# Patient Record
Sex: FEMALE | Race: WHITE | NOT HISPANIC OR LATINO | Employment: OTHER | URBAN - NONMETROPOLITAN AREA
[De-identification: names, ages, dates, MRNs, and addresses within clinical notes are randomized per-mention and may not be internally consistent; named-entity substitution may affect disease eponyms.]

---

## 2023-06-07 ENCOUNTER — OFFICE VISIT (OUTPATIENT)
Dept: PRIMARY CARE | Facility: CLINIC | Age: 63
End: 2023-06-07
Payer: COMMERCIAL

## 2023-06-07 VITALS
HEART RATE: 75 BPM | WEIGHT: 163.1 LBS | BODY MASS INDEX: 24.16 KG/M2 | HEIGHT: 69 IN | OXYGEN SATURATION: 99 % | SYSTOLIC BLOOD PRESSURE: 122 MMHG | DIASTOLIC BLOOD PRESSURE: 78 MMHG

## 2023-06-07 DIAGNOSIS — H10.13 ALLERGIC CONJUNCTIVITIS OF BOTH EYES: ICD-10-CM

## 2023-06-07 DIAGNOSIS — E78.00 HIGH CHOLESTEROL: Primary | ICD-10-CM

## 2023-06-07 DIAGNOSIS — G43.009 MIGRAINE WITHOUT AURA AND WITHOUT STATUS MIGRAINOSUS, NOT INTRACTABLE: ICD-10-CM

## 2023-06-07 PROCEDURE — 99214 OFFICE O/P EST MOD 30 MIN: CPT | Performed by: FAMILY MEDICINE

## 2023-06-07 PROCEDURE — 1036F TOBACCO NON-USER: CPT | Performed by: FAMILY MEDICINE

## 2023-06-07 RX ORDER — SUMATRIPTAN 50 MG/1
50 TABLET, FILM COATED ORAL EVERY 2 HOUR PRN
Qty: 9 TABLET | Refills: 11 | Status: SHIPPED | OUTPATIENT
Start: 2023-06-07

## 2023-06-07 RX ORDER — AZELASTINE HYDROCHLORIDE 0.5 MG/ML
1 SOLUTION/ DROPS OPHTHALMIC 2 TIMES DAILY
Qty: 6 ML | Refills: 11 | Status: SHIPPED | OUTPATIENT
Start: 2023-06-07 | End: 2024-06-07 | Stop reason: SDUPTHER

## 2023-06-07 RX ORDER — ROSUVASTATIN CALCIUM 20 MG/1
20 TABLET, COATED ORAL DAILY
Qty: 90 TABLET | Refills: 3 | Status: SHIPPED | OUTPATIENT
Start: 2023-06-07 | End: 2024-06-07 | Stop reason: SDUPTHER

## 2023-06-07 RX ORDER — ASPIRIN 81 MG/1
81 TABLET ORAL
COMMUNITY

## 2023-06-07 RX ORDER — SUMATRIPTAN 50 MG/1
TABLET, FILM COATED ORAL
COMMUNITY
End: 2023-06-07 | Stop reason: SDUPTHER

## 2023-06-07 RX ORDER — ROSUVASTATIN CALCIUM 20 MG/1
20 TABLET, COATED ORAL DAILY
COMMUNITY
End: 2023-06-07 | Stop reason: SDUPTHER

## 2023-06-07 RX ORDER — FLUTICASONE PROPIONATE 50 MCG
SPRAY, SUSPENSION (ML) NASAL
COMMUNITY

## 2023-06-07 ASSESSMENT — PATIENT HEALTH QUESTIONNAIRE - PHQ9
2. FEELING DOWN, DEPRESSED OR HOPELESS: NOT AT ALL
SUM OF ALL RESPONSES TO PHQ9 QUESTIONS 1 AND 2: 0
1. LITTLE INTEREST OR PLEASURE IN DOING THINGS: NOT AT ALL

## 2023-06-07 NOTE — PROGRESS NOTES
"Subjective   Patient ID: Latanya Rushing is a 62 y.o. female who presents for 1 year mdck.    HPI   High cholesterol has been on crestor for 4 years with no SE     All Rhin otc meds    On eye drops rx  spring is worse     Migraine HA with no aura some photophobia     On sumatriptan uses less than 18 per year    Usually takes 2 sumatriptan  most of time         Mammogram with [facility]  Bone density osteopenia and on calcium vitamin d  Aerobic exercise plays pickleball 3 to 4 times  per week   Colonoscopy July 22, 2019 needed every 5 years for family history      Review of Systems    Objective   /78   Pulse 75   Ht 1.753 m (5' 9\")   Wt 74 kg (163 lb 1.6 oz)   SpO2 99%   BMI 24.09 kg/m²     Physical Exam  Vitals reviewed.   Constitutional:       Appearance: Normal appearance.   HENT:      Head: Normocephalic and atraumatic.   Eyes:      Conjunctiva/sclera: Conjunctivae normal.   Cardiovascular:      Rate and Rhythm: Normal rate and regular rhythm.   Pulmonary:      Effort: Pulmonary effort is normal.      Breath sounds: Normal breath sounds.   Musculoskeletal:      Cervical back: Neck supple.   Skin:     General: Skin is warm and dry.   Neurological:      General: No focal deficit present.      Mental Status: She is alert and oriented to person, place, and time.   Psychiatric:         Mood and Affect: Mood normal.         Behavior: Behavior normal.         Thought Content: Thought content normal.         Judgment: Judgment normal.         Assessment/Plan   Diagnoses and all orders for this visit:  High cholesterol  -     rosuvastatin (Crestor) 20 mg tablet; Take 1 tablet (20 mg) by mouth once daily.  Allergic conjunctivitis of both eyes  -     azelastine (Optivar) 0.05 % ophthalmic solution; Administer 1 drop into both eyes 2 times a day.  Migraine without aura and without status migrainosus, not intractable  -     SUMAtriptan (Imitrex) 50 mg tablet; Take 1 tablet (50 mg) by mouth every 2 hours if needed " for migraine (with max of 200 mg per day).

## 2023-07-31 ENCOUNTER — TELEMEDICINE (OUTPATIENT)
Dept: PRIMARY CARE | Facility: CLINIC | Age: 63
End: 2023-07-31
Payer: COMMERCIAL

## 2023-07-31 DIAGNOSIS — U07.1 COVID-19 VIRUS INFECTION: Primary | ICD-10-CM

## 2023-07-31 PROCEDURE — 99441 PR PHYS/QHP TELEPHONE EVALUATION 5-10 MIN: CPT | Performed by: FAMILY MEDICINE

## 2023-07-31 NOTE — PROGRESS NOTES
Subjective   Patient ID: Latanya Rushing is a 63 y.o. female who presents for pt. covid+ (Pt. C/o headache, cough, ST, chills, body aches, and congestions x 3 days.).    HPI     VV telephone       + covid  yesterday   Mother had covid   Cough Friday July 28   Had covid 2 years ago   3 vaccines         Review of Systems    Objective   There were no vitals taken for this visit.    Physical Exam  Vitals reviewed.   Constitutional:       Appearance: Normal appearance.   HENT:      Nose: Congestion (sounds congested on phone) present.   Skin:     General: Skin is warm.   Neurological:      General: No focal deficit present.      Mental Status: She is alert and oriented to person, place, and time.   Psychiatric:         Mood and Affect: Mood normal.         Behavior: Behavior normal.         Thought Content: Thought content normal.         Judgment: Judgment normal.         Assessment/Plan   Diagnoses and all orders for this visit:  COVID-19 virus infection  -     nirmatrelvir-ritonavir (PAXLOVID) 300 mg (150 mg x 2)-100 mg tablet therapy pack; Take 3 tablets by mouth 2 times a day for 5 days. Follow the instructions on the package       Will have pt hold crestor

## 2023-09-01 ENCOUNTER — OFFICE VISIT (OUTPATIENT)
Dept: PRIMARY CARE | Facility: CLINIC | Age: 63
End: 2023-09-01
Payer: COMMERCIAL

## 2023-09-01 VITALS
BODY MASS INDEX: 24.59 KG/M2 | SYSTOLIC BLOOD PRESSURE: 112 MMHG | DIASTOLIC BLOOD PRESSURE: 80 MMHG | HEART RATE: 96 BPM | WEIGHT: 166 LBS | OXYGEN SATURATION: 97 % | HEIGHT: 69 IN

## 2023-09-01 DIAGNOSIS — M25.531 RIGHT WRIST PAIN: Primary | ICD-10-CM

## 2023-09-01 PROCEDURE — 99213 OFFICE O/P EST LOW 20 MIN: CPT | Performed by: FAMILY MEDICINE

## 2023-09-01 PROCEDURE — 1036F TOBACCO NON-USER: CPT | Performed by: FAMILY MEDICINE

## 2023-09-01 NOTE — PROGRESS NOTES
"Subjective   Patient ID: Latanya Rushing is a 63 y.o. female who presents for fall (Pt c/o fall and hurt right wrist.).    HPI   Playing pickleball this afternoon and fell to the right with paddle and jammend wrist and now painful and swollen   Iceing       Review of Systems    Objective   /80   Pulse 96   Ht 1.753 m (5' 9\")   Wt 75.3 kg (166 lb)   SpO2 97%   BMI 24.51 kg/m²     Physical Exam  Constitutional:       Appearance: Normal appearance.   Musculoskeletal:      Comments: FROMof shoulder and elbow,  seems most tender radial side of wrist base of right thumb   Normal ms strength of right thumb    Skin:     General: Skin is warm and dry.   Neurological:      Mental Status: She is alert.   Psychiatric:         Mood and Affect: Mood normal.         Behavior: Behavior normal.         Thought Content: Thought content normal.         Judgment: Judgment normal.         Assessment/Plan   Diagnoses and all orders for this visit:  Right wrist pain  -     XR wrist right 3+ views; Future  -     XR hand right 1-2 views; Future    If fx will seen Dr HUITRON   "

## 2024-06-07 ENCOUNTER — OFFICE VISIT (OUTPATIENT)
Dept: PRIMARY CARE | Facility: CLINIC | Age: 64
End: 2024-06-07
Payer: COMMERCIAL

## 2024-06-07 VITALS
DIASTOLIC BLOOD PRESSURE: 76 MMHG | OXYGEN SATURATION: 97 % | HEART RATE: 81 BPM | BODY MASS INDEX: 24.55 KG/M2 | HEIGHT: 68 IN | WEIGHT: 162 LBS | SYSTOLIC BLOOD PRESSURE: 116 MMHG

## 2024-06-07 DIAGNOSIS — E78.1 HYPERTRIGLYCERIDEMIA: ICD-10-CM

## 2024-06-07 DIAGNOSIS — Z12.31 ENCOUNTER FOR SCREENING MAMMOGRAM FOR BREAST CANCER: ICD-10-CM

## 2024-06-07 DIAGNOSIS — H10.13 ALLERGIC CONJUNCTIVITIS OF BOTH EYES: ICD-10-CM

## 2024-06-07 DIAGNOSIS — G47.10 HYPERSOMNOLENCE: ICD-10-CM

## 2024-06-07 DIAGNOSIS — E78.00 HIGH CHOLESTEROL: ICD-10-CM

## 2024-06-07 DIAGNOSIS — Z12.11 COLON CANCER SCREENING: Primary | ICD-10-CM

## 2024-06-07 PROCEDURE — 99214 OFFICE O/P EST MOD 30 MIN: CPT | Performed by: FAMILY MEDICINE

## 2024-06-07 PROCEDURE — 1036F TOBACCO NON-USER: CPT | Performed by: FAMILY MEDICINE

## 2024-06-07 RX ORDER — AZELASTINE HYDROCHLORIDE 0.5 MG/ML
1 SOLUTION/ DROPS OPHTHALMIC 2 TIMES DAILY
Qty: 6 ML | Refills: 11 | Status: SHIPPED | OUTPATIENT
Start: 2024-06-07 | End: 2025-06-07

## 2024-06-07 RX ORDER — ROSUVASTATIN CALCIUM 20 MG/1
20 TABLET, COATED ORAL DAILY
Qty: 90 TABLET | Refills: 3 | Status: SHIPPED | OUTPATIENT
Start: 2024-06-07 | End: 2025-06-07

## 2024-06-07 NOTE — PROGRESS NOTES
"Subjective   Patient ID: Latanya Rushing is a 63 y.o. female who presents for 1 year follow up .    HPI   High cholesterol has been on crestor for 5 years with no SE     Was started due to high CT calcium score      All Rhin otc meds    On eye drops rx  spring is worse      Migraine HA with no aura some photophobia     Usually takes 2 sumatriptan  most of time     Will call when needing refill     Last sumatriptan 7 months        Ankle swelling   By night is worse   No pnd orthopnea   Treadmill 3 to 4 days per week   Pickleball 3 to 4 days per week      Mammogram  Bone density osteopenia and on calcium vitamin d  Aerobic exercise plays pickleball 3 to 4 times  per week   Colonoscopy July 22, 2019 needed every 5 years for family history    Father had Alzheimers    States some memory issues    Discussed ways to maintain mental activity         Hypersomnolence   Snoring   Apnea per    Gets normal amount of sleep   Occ am HA and has nigjht time congestion     Review of Systems    Objective   /76   Pulse 81   Ht 1.727 m (5' 8\")   Wt 73.5 kg (162 lb)   SpO2 97%   BMI 24.63 kg/m²     Physical Exam  Vitals reviewed.   Constitutional:       Appearance: Normal appearance.   HENT:      Head: Normocephalic and atraumatic.   Eyes:      Conjunctiva/sclera: Conjunctivae normal.   Cardiovascular:      Rate and Rhythm: Normal rate and regular rhythm.   Pulmonary:      Effort: Pulmonary effort is normal.      Breath sounds: Normal breath sounds.   Musculoskeletal:      Cervical back: Neck supple.   Skin:     General: Skin is warm and dry.   Neurological:      General: No focal deficit present.      Mental Status: She is alert and oriented to person, place, and time.   Psychiatric:         Mood and Affect: Mood normal.         Behavior: Behavior normal.         Thought Content: Thought content normal.         Judgment: Judgment normal.         Assessment/Plan   Diagnoses and all orders for this visit:  Colon cancer " screening  -     Colonoscopy Screening; Average Risk Patient; Future  Allergic conjunctivitis of both eyes  -     azelastine (Optivar) 0.05 % ophthalmic solution; Administer 1 drop into both eyes 2 times a day.  High cholesterol  -     rosuvastatin (Crestor) 20 mg tablet; Take 1 tablet (20 mg) by mouth once daily.  -     Lipid Panel; Future  -     Basic Metabolic Panel; Future  -     TSH with reflex to Free T4 if abnormal; Future  Encounter for screening mammogram for breast cancer  -     BI mammo bilateral screening tomosynthesis; Future  Hypersomnolence  -     Home sleep apnea test (HSAT); Future  Hypertriglyceridemia  -     CT cardiac scoring wo IV contrast; Future

## 2024-06-08 ENCOUNTER — LAB (OUTPATIENT)
Dept: LAB | Facility: LAB | Age: 64
End: 2024-06-08
Payer: COMMERCIAL

## 2024-06-08 DIAGNOSIS — E78.00 HIGH CHOLESTEROL: ICD-10-CM

## 2024-06-08 LAB
ANION GAP SERPL CALC-SCNC: 11 MMOL/L (ref 10–20)
BUN SERPL-MCNC: 22 MG/DL (ref 6–23)
CALCIUM SERPL-MCNC: 9.5 MG/DL (ref 8.6–10.3)
CHLORIDE SERPL-SCNC: 104 MMOL/L (ref 98–107)
CHOLEST SERPL-MCNC: 158 MG/DL (ref 0–199)
CHOLESTEROL/HDL RATIO: 2.5
CO2 SERPL-SCNC: 29 MMOL/L (ref 21–32)
CREAT SERPL-MCNC: 0.74 MG/DL (ref 0.5–1.05)
EGFRCR SERPLBLD CKD-EPI 2021: >90 ML/MIN/1.73M*2
GLUCOSE SERPL-MCNC: 101 MG/DL (ref 74–99)
HDLC SERPL-MCNC: 62 MG/DL
LDLC SERPL CALC-MCNC: 64 MG/DL
NON HDL CHOLESTEROL: 96 MG/DL (ref 0–149)
POTASSIUM SERPL-SCNC: 4.5 MMOL/L (ref 3.5–5.3)
SODIUM SERPL-SCNC: 139 MMOL/L (ref 136–145)
TRIGL SERPL-MCNC: 159 MG/DL (ref 0–149)
TSH SERPL-ACNC: 1.91 MIU/L (ref 0.44–3.98)
VLDL: 32 MG/DL (ref 0–40)

## 2024-06-08 PROCEDURE — 80061 LIPID PANEL: CPT

## 2024-06-08 PROCEDURE — 36415 COLL VENOUS BLD VENIPUNCTURE: CPT

## 2024-06-08 PROCEDURE — 84443 ASSAY THYROID STIM HORMONE: CPT

## 2024-06-08 PROCEDURE — 80048 BASIC METABOLIC PNL TOTAL CA: CPT

## 2024-06-18 ENCOUNTER — HOSPITAL ENCOUNTER (OUTPATIENT)
Dept: RADIOLOGY | Facility: HOSPITAL | Age: 64
Discharge: HOME | End: 2024-06-18
Payer: COMMERCIAL

## 2024-06-18 ENCOUNTER — APPOINTMENT (OUTPATIENT)
Dept: RADIOLOGY | Facility: HOSPITAL | Age: 64
End: 2024-06-18
Payer: COMMERCIAL

## 2024-06-18 DIAGNOSIS — E78.1 HYPERTRIGLYCERIDEMIA: ICD-10-CM

## 2024-06-18 PROCEDURE — 75571 CT HRT W/O DYE W/CA TEST: CPT

## 2024-06-21 ENCOUNTER — APPOINTMENT (OUTPATIENT)
Dept: PRIMARY CARE | Facility: CLINIC | Age: 64
End: 2024-06-21
Payer: COMMERCIAL

## 2024-06-21 DIAGNOSIS — Z12.11 COLON CANCER SCREENING: ICD-10-CM

## 2024-07-11 DIAGNOSIS — G47.10 HYPERSOMNOLENCE: ICD-10-CM

## 2024-07-11 DIAGNOSIS — R93.1 AGATSTON CORONARY ARTERY CALCIUM SCORE BETWEEN 100 AND 400: Primary | ICD-10-CM

## 2024-07-15 ENCOUNTER — CLINICAL SUPPORT (OUTPATIENT)
Dept: SLEEP MEDICINE | Facility: HOSPITAL | Age: 64
End: 2024-07-15
Payer: COMMERCIAL

## 2024-07-15 VITALS — BODY MASS INDEX: 24.56 KG/M2 | HEIGHT: 68 IN | WEIGHT: 162.04 LBS

## 2024-07-15 DIAGNOSIS — G47.9 SLEEP DISORDER, UNSPECIFIED: ICD-10-CM

## 2024-07-15 DIAGNOSIS — G47.10 HYPERSOMNOLENCE: ICD-10-CM

## 2024-07-15 PROCEDURE — 95806 SLEEP STUDY UNATT&RESP EFFT: CPT | Performed by: PSYCHIATRY & NEUROLOGY

## 2024-07-15 NOTE — PROGRESS NOTES
[facility] TECH NOTE:     Patient: Latanya Rushing   MRN//AGE: [id number]  1960  64 y.o.   Technologist: Susy Woodson RRT   Room: Home Sleep Study   Service Date: 7/15/2024        Sleep Testing Location: [place] [address]      [place]: 10    TECHNOLOGIST SLEEP STUDY PROCEDURE NOTE:   This sleep study is being conducted according to the policies and procedures outlined by the AAS accreditation standards.  The sleep study procedure and processes involved during this appointment was explained to the patient/patient’s family, questions were answered. The patient/family verbalized understanding.      The patient is a 64 y.o. year old female scheduled for a Home Sleep Apnea test.      The full study Was completed.  Patient questionnaires completed?: yes     Consents signed? yes    Initial Fall Risk Screening:     Latanya has not fallen in the last 6 months. her did not result in injury. Latanya does not have a fear of falling. He does not need assistance with sitting, standing, or walking. she does not need assistance walking in her home. she does not need assistance in an unfamiliar setting. The patient is notusing an assistive device.     The Patient received equipment and instructions for use of the [employer] HSAT device. The patient was instructed how to apply the effort belt, cannula, thermistor. It was explained how the Nomad and oximeter components work. Patient was informed of their responsibility for the device and acknowledged this by signing the HSAT device contract. The patient was asked to return the device on the next day and shown where to return.     Brief Study observations:      Deviation to order/protocol and reason:       If PAP, which was preferred mask/pressure/mode:       Other:    After the procedure, the patient/family was informed to ensure followup with ordering clinician for testing results.      Technologist: Susy Woodson RRT

## 2024-07-18 ENCOUNTER — TELEPHONE (OUTPATIENT)
Dept: PRIMARY CARE | Facility: CLINIC | Age: 64
End: 2024-07-18
Payer: COMMERCIAL

## 2024-07-18 DIAGNOSIS — H10.13 ALLERGIC CONJUNCTIVITIS OF BOTH EYES: Primary | ICD-10-CM

## 2024-07-18 RX ORDER — MONTELUKAST SODIUM 10 MG/1
10 TABLET ORAL NIGHTLY
Qty: 30 TABLET | Refills: 3 | Status: SHIPPED | OUTPATIENT
Start: 2024-07-18

## 2024-07-18 RX ORDER — MONTELUKAST SODIUM 10 MG/1
10 TABLET ORAL NIGHTLY
COMMUNITY
End: 2024-07-18 | Stop reason: SDUPTHER

## 2024-07-18 NOTE — TELEPHONE ENCOUNTER
Patient called in and would like a refill of the following medication.... singular. She takes one at night as needed. She would like this called into [facility] pharmacy in [place].    Thank you

## 2024-07-30 ENCOUNTER — APPOINTMENT (OUTPATIENT)
Dept: CARDIOLOGY | Facility: CLINIC | Age: 64
End: 2024-07-30
Payer: COMMERCIAL

## 2024-08-06 ENCOUNTER — APPOINTMENT (OUTPATIENT)
Dept: CARDIOLOGY | Facility: CLINIC | Age: 64
End: 2024-08-06
Payer: COMMERCIAL

## 2024-08-06 VITALS
BODY MASS INDEX: 26.02 KG/M2 | WEIGHT: 165.8 LBS | OXYGEN SATURATION: 98 % | HEART RATE: 94 BPM | DIASTOLIC BLOOD PRESSURE: 80 MMHG | HEIGHT: 67 IN | SYSTOLIC BLOOD PRESSURE: 124 MMHG

## 2024-08-06 DIAGNOSIS — R93.1 AGATSTON CORONARY ARTERY CALCIUM SCORE BETWEEN 100 AND 400: ICD-10-CM

## 2024-08-06 DIAGNOSIS — I70.90 ATHEROSCLEROSIS: Primary | ICD-10-CM

## 2024-08-06 PROCEDURE — 1036F TOBACCO NON-USER: CPT | Performed by: STUDENT IN AN ORGANIZED HEALTH CARE EDUCATION/TRAINING PROGRAM

## 2024-08-06 PROCEDURE — 99214 OFFICE O/P EST MOD 30 MIN: CPT | Performed by: STUDENT IN AN ORGANIZED HEALTH CARE EDUCATION/TRAINING PROGRAM

## 2024-08-06 PROCEDURE — 3008F BODY MASS INDEX DOCD: CPT | Performed by: STUDENT IN AN ORGANIZED HEALTH CARE EDUCATION/TRAINING PROGRAM

## 2024-08-06 NOTE — PROGRESS NOTES
Chief Complaint   Patient presents with    Results     CT cardiac score     HPI:  I was requested by Dr. Ku to evaluate this patient in consultation for cardiac assessment.    Mrs. Latanya Rushing is a 64 y.o. year old non-smoker female patient with past medical history significant for hypertension, hyperlipidemia, coming for cardiovascular assessment due to elevated calcium scoring of 328. Patient is very active, exercises every day, has a healthy diet, takes all her medications properly but she is frustrated that she has elevated calcium scoring. She is currently asymptomatic. She denies chest pain, shortness of breath, palpitations, leg edema, lightheadedness, headaches, fever, chills, orthopnea, paroxysmal nocturnal dyspnea or syncope.    EKG shows normal sinus rhythm with no signs of acute ischemic changes.     Past Medical History  Past Medical History:   Diagnosis Date    Personal history of other specified conditions 06/27/2022    History of persistent cough       Past Surgical History  Past Surgical History:   Procedure Laterality Date    OTHER SURGICAL HISTORY  11/18/2019    Colonoscopy    OTHER SURGICAL HISTORY  11/18/2019    Tonsillectomy with adenoidectomy    OTHER SURGICAL HISTORY  11/18/2019    Urethral dilation    OTHER SURGICAL HISTORY  11/18/2019    Endometrial ablation    OTHER SURGICAL HISTORY  03/01/2021    Dilation and curettage       Past Family History  No family history on file.    Allergy History  Allergies   Allergen Reactions    Amoxicillin Itching    Avocado Other    Oxytetracycline Other     Other reaction(s): Unknown    Phenobarbital Unknown     Other reaction(s): Hives    Shellfish Containing Products Other    Shellfish Derived Other     Other reaction(s): Gatrointestinal upset    Terramycin Other    Sulfamethoxazole-Trimethoprim Rash       Past Social History  Social History     Socioeconomic History    Marital status:    Tobacco Use    Smoking status: Never    Smokeless  "tobacco: Never   Substance and Sexual Activity    Alcohol use: Not Currently     Alcohol/week: 4.0 standard drinks of alcohol     Types: 4 Standard drinks or equivalent per week    Drug use: Never       Social History     Tobacco Use   Smoking Status Never   Smokeless Tobacco Never       Review of Systems:  Constitutional: Denies any fever or chills  Eyes: Denies any eye pain or blurry vision  ENT: Denies any ear pain or hearing loss  Cardiovascular: The heart rate is not slow, the heart rate is not fast  Respiratory: Denies any asthma/wheezing  Gastrointestinal: Denies any olman colored stools or fatty food intolerance  Genitourinary: Denies any blood in the urine or pelvic pain  Musculoskeletal: Denies any swelling in the joints or difficulty walking  Skin: Denies any skin lumps or skin lesions  Neurological: Denies any dizziness/tingling      Objective Data:  Last Recorded Vitals:  Vitals:    08/06/24 1506   BP: 124/80   Pulse: 94   SpO2: 98%   Weight: 75.2 kg (165 lb 12.8 oz)   Height: 1.702 m (5' 7\")       Last Labs:  CBC - No results in last year.  _ _ _    _      CMP - 6/8/2024:  8:09 AM  9.5 _ _ --- _   _ _ _ _      PTT - No results in last year.  _   _ _     LDLCALC   Date/Time Value Ref Range Status   06/08/2024 08:09 AM 64 <=99 mg/dL Final     Comment:                                 Near   Borderline      AGE      Desirable  Optimal    High     High     Very High     0-19 Y     0 - 109     ---    110-129   >/= 130     ----    20-24 Y     0 - 119     ---    120-159   >/= 160     ----      >24 Y     0 -  99   100-129  130-159   160-189     >/=190       VLDL   Date/Time Value Ref Range Status   06/08/2024 08:09 AM 32 0 - 40 mg/dL Final   06/23/2022 08:52 AM 32 0 - 40 mg/dL Final   06/04/2021 07:56 AM 16 0 - 40 mg/dL Final   06/09/2020 10:56 AM 42 0 - 40 mg/dL Final        Patient Medications:  Outpatient Encounter Medications as of 8/6/2024   Medication Sig Dispense Refill    aspirin 81 mg EC tablet Take 1 " tablet (81 mg) by mouth once daily.      azelastine (Optivar) 0.05 % ophthalmic solution Administer 1 drop into both eyes 2 times a day. 6 mL 11    fluticasone (Flonase Allergy Relief) 50 mcg/actuation nasal spray Administer into affected nostril(s).      loratadine (Claritin) 5 mg split tablet Take by mouth.      montelukast (Singulair) 10 mg tablet Take 1 tablet (10 mg) by mouth once daily at bedtime. 30 tablet 3    multivitamin-Ca-iron-minerals tablet Take by mouth once daily.      rosuvastatin (Crestor) 20 mg tablet Take 1 tablet (20 mg) by mouth once daily. 90 tablet 3    SUMAtriptan (Imitrex) 50 mg tablet Take 1 tablet (50 mg) by mouth every 2 hours if needed for migraine (with max of 200 mg per day). 9 tablet 11     No facility-administered encounter medications on file as of 8/6/2024.       Physical Exam:  General: alert, oriented and in no acute distress  HEENT: NC/AT; EOMI; PERRLA, external ear is normal  Neck: supple; trachea midline; no masses; no JVD  Chest: clear breath sounds bilaterally; no wheezing  Cardio: regular rhythm, S1S2 normal, no murmurs  Abdomen: Soft, non-tender, non-distension, no organomegaly  Extremities: no clubbing/cyanosis/edema  Neuro: Grossly intact     Psychiatric: Normal mood and affect     Past Cardiology Results (Last 3 Years):  EKG:  No results found for this or any previous visit from the past 1095 days.    Echo:  Echo Results:  No results found for this or any previous visit from the past 365 days.     Cath:  No results found for this or any previous visit from the past 1095 days.    CV NCDR CATHPCI V5 COLLECTION FORM   Stress Test:  No results found for this or any previous visit from the past 1095 days.    Cardiac Imaging:  No results found for this or any previous visit from the past 1095 days.       Assessment/Plan     Mrs. Latanya Rushing is a 64 y.o. year old non-smoker female patient with past medical history significant for hypertension, hyperlipidemia, coming for  cardiovascular assessment due to elevated calcium scoring of 328. Patient is very active, exercises every day, has a healthy diet, takes all her medications properly but she is frustrated that she has elevated calcium scoring. She is currently asymptomatic.     # Atherosclerosis  - Ct calcium scoring 328.  - Patient with known atherosclerotic disease, implying significant increased risk for major adverse cardiac events.  - Patient is currently asymptomatic.  - EKG shows normal sinus rhythm with no signs of acute ischemic changes.   - Keep ASA, high intensity statin.  - We had a thorough conversation with the patient about the natural history of atherosclerosis and the importance for commitment with healthy lifestyle, including but not limited to healthy diet, regular exercises, avoid sedentary and compliance to medication.   - Follow up with echo, stress test, 48h holter monitor.    # Hyperlipidemia  - Controlled by PCP.  - Keep home medication with Rosuvastatin 20mg daily.  - Counseled on healthy diet and regular exercise.      We have discussed the most common side effects of the prescribed medications, indications, drug interactions, risks, complications, and alternatives of medications/therapeutics were explained and discussed. The patient has been requested to monitor closely for any untoward side effects or complications of medications. The patient has been strongly advised to be compliant with the recommendations, all the questions and concerns have been addressed. The patient has been also instructed to call, to return sooner or to go to the emergency department if symptoms persist or get worsen. The patient voiced understanding and denies any further questions at this time.     This note was transcribed using the Dragon Dictation system. There may be grammatical, punctuation, or verbiage errors that occur with voice recognition programs.    Counseling greater than 50% of visit regarding all cardiac  issues.    Thank you, Dr. Ku, for allowing me to participate in the care of this patient. Please do not hesitate to contact me with any further questions or concerns.    Miki Matt MD  Cardiology

## 2024-09-16 ENCOUNTER — APPOINTMENT (OUTPATIENT)
Dept: CARDIOLOGY | Facility: HOSPITAL | Age: 64
End: 2024-09-16
Payer: COMMERCIAL

## 2024-10-07 ENCOUNTER — APPOINTMENT (OUTPATIENT)
Dept: CARDIOLOGY | Facility: CLINIC | Age: 64
End: 2024-10-07
Payer: COMMERCIAL

## 2024-10-14 ENCOUNTER — APPOINTMENT (OUTPATIENT)
Dept: CARDIOLOGY | Facility: CLINIC | Age: 64
End: 2024-10-14
Payer: COMMERCIAL

## 2024-12-19 ENCOUNTER — TELEMEDICINE (OUTPATIENT)
Dept: PRIMARY CARE | Facility: CLINIC | Age: 64
End: 2024-12-19
Payer: COMMERCIAL

## 2024-12-19 DIAGNOSIS — U07.1 COVID-19: Primary | ICD-10-CM

## 2024-12-19 PROCEDURE — 1036F TOBACCO NON-USER: CPT

## 2024-12-19 PROCEDURE — 99214 OFFICE O/P EST MOD 30 MIN: CPT

## 2024-12-19 RX ORDER — NIRMATRELVIR AND RITONAVIR 300-100 MG
3 KIT ORAL 2 TIMES DAILY
Qty: 30 TABLET | Refills: 0 | Status: SHIPPED | OUTPATIENT
Start: 2024-12-19 | End: 2024-12-24

## 2024-12-19 NOTE — PROGRESS NOTES
Subjective   Patient ID: Latanya Rushing is a 64 y.o. female who presents for Covid positive (Cough, congestion, sore throat, fatigue. ).    Virtual or Telephone Consent    A telephone visit (audio only) between the patient (at the originating site) and the provider (at the distant site) was utilized to provide this telehealth service.   Verbal consent was requested and obtained from Latanya Rushing on this date, 12/20/24 for a telehealth visit.        Patient presents for virtual visit, evaluation and treatment of COVID-19.    COVID-19: Patient is has been experiencing upper respiratory symptoms for 4 days.  She took a COVID swab yesterday and tested positive.  She is seeking guidance on quarantine procedures per CDC she should isolate for 5 days from positive test and then mask for further 10 around people.  She would like a prescription for Paxlovid.  Paxlovid Rx sent at this time.  She denies any respiratory distress or shortness of breath, predominant symptoms are congestion and some pain which she is managing with saline nasal spray, Mucinex and Tylenol.  She will follow-up with us as needed.                 Review of Systems   Constitutional:  Negative for chills, diaphoresis, fatigue and unexpected weight change.   HENT:  Positive for congestion and rhinorrhea. Negative for dental problem, tinnitus and trouble swallowing.    Eyes:  Negative for visual disturbance.   Respiratory:  Positive for cough. Negative for chest tightness and shortness of breath.    Cardiovascular:  Negative for chest pain, palpitations and leg swelling.   Gastrointestinal:  Negative for abdominal pain, constipation, diarrhea, nausea and rectal pain.   Endocrine: Negative for polydipsia, polyphagia and polyuria.   Genitourinary:  Negative for difficulty urinating, frequency and urgency.   Musculoskeletal:  Negative for arthralgias and myalgias.   Skin:  Negative for pallor and wound.   Neurological:  Negative for syncope, weakness,  numbness and headaches.   Psychiatric/Behavioral:  Negative for suicidal ideas. The patient is not nervous/anxious.        Objective   There were no vitals taken for this visit.    Physical Exam  Neurological:      General: No focal deficit present.      Mental Status: She is alert and oriented to person, place, and time. Mental status is at baseline.   Psychiatric:         Mood and Affect: Mood normal.         Behavior: Behavior normal.         Thought Content: Thought content normal.         Judgment: Judgment normal.         Assessment/Plan   Diagnoses and all orders for this visit:  COVID-19  -     nirmatrelvir-ritonavir (Paxlovid) 300 mg (150 mg x 2)-100 mg tablet therapy pack; Take 3 tablets by mouth 2 times a day for 5 days. Follow the instructions on the package

## 2024-12-20 PROBLEM — U07.1 COVID-19: Status: ACTIVE | Noted: 2024-12-20

## 2024-12-20 ASSESSMENT — ENCOUNTER SYMPTOMS
NUMBNESS: 0
RECTAL PAIN: 0
MYALGIAS: 0
CHEST TIGHTNESS: 0
POLYDIPSIA: 0
UNEXPECTED WEIGHT CHANGE: 0
CONSTIPATION: 0
DIARRHEA: 0
SHORTNESS OF BREATH: 0
NERVOUS/ANXIOUS: 0
ABDOMINAL PAIN: 0
WOUND: 0
DIAPHORESIS: 0
PALPITATIONS: 0
TROUBLE SWALLOWING: 0
WEAKNESS: 0
HEADACHES: 0
POLYPHAGIA: 0
FREQUENCY: 0
COUGH: 1
CHILLS: 0
ARTHRALGIAS: 0
NAUSEA: 0
DIFFICULTY URINATING: 0
FATIGUE: 0
RHINORRHEA: 1

## 2025-01-21 ENCOUNTER — OFFICE VISIT (OUTPATIENT)
Dept: PRIMARY CARE | Facility: CLINIC | Age: 65
End: 2025-01-21
Payer: COMMERCIAL

## 2025-01-21 ENCOUNTER — HOSPITAL ENCOUNTER (OUTPATIENT)
Dept: RADIOLOGY | Facility: CLINIC | Age: 65
Discharge: HOME | End: 2025-01-21
Payer: COMMERCIAL

## 2025-01-21 VITALS
WEIGHT: 168.7 LBS | HEART RATE: 92 BPM | HEIGHT: 67 IN | SYSTOLIC BLOOD PRESSURE: 130 MMHG | OXYGEN SATURATION: 97 % | BODY MASS INDEX: 26.48 KG/M2 | DIASTOLIC BLOOD PRESSURE: 86 MMHG

## 2025-01-21 DIAGNOSIS — M25.572 ACUTE LEFT ANKLE PAIN: Primary | ICD-10-CM

## 2025-01-21 DIAGNOSIS — M25.572 ACUTE LEFT ANKLE PAIN: ICD-10-CM

## 2025-01-21 PROCEDURE — 1036F TOBACCO NON-USER: CPT

## 2025-01-21 PROCEDURE — 3008F BODY MASS INDEX DOCD: CPT

## 2025-01-21 PROCEDURE — 73610 X-RAY EXAM OF ANKLE: CPT | Mod: LT

## 2025-01-21 PROCEDURE — 99212 OFFICE O/P EST SF 10 MIN: CPT

## 2025-01-21 PROCEDURE — 73610 X-RAY EXAM OF ANKLE: CPT | Mod: LEFT SIDE | Performed by: RADIOLOGY

## 2025-01-21 ASSESSMENT — ENCOUNTER SYMPTOMS
NUMBNESS: 0
UNEXPECTED WEIGHT CHANGE: 0
DIFFICULTY URINATING: 0
FREQUENCY: 0
RECTAL PAIN: 0
DIARRHEA: 0
PALPITATIONS: 0
POLYPHAGIA: 0
ARTHRALGIAS: 1
CHILLS: 0
HEADACHES: 0
CONSTIPATION: 0
NAUSEA: 0
FATIGUE: 0
MYALGIAS: 1
DIAPHORESIS: 0
ABDOMINAL PAIN: 0
POLYDIPSIA: 0
TROUBLE SWALLOWING: 0
WEAKNESS: 0
WOUND: 0
CHEST TIGHTNESS: 0
SHORTNESS OF BREATH: 0
NERVOUS/ANXIOUS: 0
JOINT SWELLING: 1

## 2025-01-21 ASSESSMENT — PATIENT HEALTH QUESTIONNAIRE - PHQ9
1. LITTLE INTEREST OR PLEASURE IN DOING THINGS: NOT AT ALL
SUM OF ALL RESPONSES TO PHQ9 QUESTIONS 1 AND 2: 0
2. FEELING DOWN, DEPRESSED OR HOPELESS: NOT AT ALL

## 2025-01-21 NOTE — PROGRESS NOTES
"Subjective   Patient ID: Latanya Rushing is a 64 y.o. female who presents for Ankle Pain (Pt is here today for rolling her left ankle, denies this being work related. ).    Patient presents for evaluation of acute injury of her left ankle.  Was playing pickle ball approximately 5 hours prior to arrival.  States she rolled her ankle.  Is complaining of forefoot and left medial malleoli are tenderness.  There is some inflammation.  There is no ecchymosis.  She is ambulating on crutches.  She has not taken any pain medication at this time.  Has applied ice.  She does not want any prescriptive pain medication for treatment.  She states she will take Advil at home and use ice.  I ordered an x-ray for evaluation of the injury.  Will contact the patient when the results come back.             Review of Systems   Constitutional:  Negative for chills, diaphoresis, fatigue and unexpected weight change.   HENT:  Negative for dental problem, tinnitus and trouble swallowing.    Eyes:  Negative for visual disturbance.   Respiratory:  Negative for chest tightness and shortness of breath.    Cardiovascular:  Negative for chest pain, palpitations and leg swelling.   Gastrointestinal:  Negative for abdominal pain, constipation, diarrhea, nausea and rectal pain.   Endocrine: Negative for polydipsia, polyphagia and polyuria.   Genitourinary:  Negative for difficulty urinating, frequency and urgency.   Musculoskeletal:  Positive for arthralgias, gait problem, joint swelling and myalgias.   Skin:  Negative for pallor and wound.   Neurological:  Negative for syncope, weakness, numbness and headaches.   Psychiatric/Behavioral:  Negative for suicidal ideas. The patient is not nervous/anxious.        Objective   /86   Pulse 92   Ht 1.702 m (5' 7\")   Wt 76.5 kg (168 lb 11.2 oz)   SpO2 97%   BMI 26.42 kg/m²     Physical Exam  Vitals and nursing note reviewed.   Constitutional:       General: She is not in acute distress.     " Appearance: Normal appearance. She is normal weight.   HENT:      Head: Normocephalic.      Nose: Nose normal.      Mouth/Throat:      Mouth: Mucous membranes are moist.      Pharynx: Oropharynx is clear.   Eyes:      Pupils: Pupils are equal, round, and reactive to light.   Cardiovascular:      Rate and Rhythm: Normal rate and regular rhythm.      Pulses: Normal pulses.   Pulmonary:      Effort: Pulmonary effort is normal.   Abdominal:      General: Bowel sounds are normal.   Musculoskeletal:         General: Swelling (left ankle/forefoot), tenderness (left ankle) and signs of injury (left ankle) present. No deformity.      Cervical back: Normal range of motion.      Right lower leg: No edema.      Left lower leg: No edema.   Skin:     General: Skin is warm and dry.      Capillary Refill: Capillary refill takes less than 2 seconds.   Neurological:      General: No focal deficit present.      Mental Status: She is alert and oriented to person, place, and time. Mental status is at baseline.      Cranial Nerves: No cranial nerve deficit.      Motor: Weakness (left ankle) present.      Coordination: Coordination normal.      Deep Tendon Reflexes: Reflexes normal.   Psychiatric:         Mood and Affect: Mood normal.         Behavior: Behavior normal.         Thought Content: Thought content normal.         Judgment: Judgment normal.         Assessment/Plan   Diagnoses and all orders for this visit:  Acute left ankle pain  -     XR ankle left 3+ views; Future

## 2025-01-22 ENCOUNTER — TELEPHONE (OUTPATIENT)
Dept: PRIMARY CARE | Facility: CLINIC | Age: 65
End: 2025-01-22
Payer: COMMERCIAL

## 2025-01-22 NOTE — TELEPHONE ENCOUNTER
Let her know that we are still waiting on the read.  Are you able to call over radiology and just ask them if there is some sort of global delay because there are multiple reads that have been performed as far back as 2 to 3 days ago that I am not getting results on yet.

## 2025-01-22 NOTE — TELEPHONE ENCOUNTER
Called to find out the results of her x-ray taken yesterday. I just checked her chart and doesn't look like its been read yet.

## 2025-03-20 RX ORDER — LACTOBACILLUS COMBINATION NO.4 3B CELL
CAPSULE ORAL
COMMUNITY

## 2025-03-25 ENCOUNTER — HOSPITAL ENCOUNTER (OUTPATIENT)
Dept: OPERATING ROOM | Facility: HOSPITAL | Age: 65
Setting detail: OUTPATIENT SURGERY
Discharge: HOME | End: 2025-03-25
Payer: COMMERCIAL

## 2025-03-25 ENCOUNTER — APPOINTMENT (OUTPATIENT)
Dept: GASTROENTEROLOGY | Facility: CLINIC | Age: 65
End: 2025-03-25
Payer: COMMERCIAL

## 2025-03-25 VITALS
RESPIRATION RATE: 18 BRPM | HEART RATE: 79 BPM | TEMPERATURE: 97.5 F | BODY MASS INDEX: 25.16 KG/M2 | OXYGEN SATURATION: 97 % | SYSTOLIC BLOOD PRESSURE: 115 MMHG | HEIGHT: 68 IN | DIASTOLIC BLOOD PRESSURE: 71 MMHG | WEIGHT: 166.01 LBS

## 2025-03-25 DIAGNOSIS — Z12.11 COLON CANCER SCREENING: ICD-10-CM

## 2025-03-25 PROCEDURE — 7100000009 HC PHASE TWO TIME - INITIAL BASE CHARGE: Performed by: INTERNAL MEDICINE

## 2025-03-25 PROCEDURE — 3700000012 HC SEDATION LEVEL 5+ TIME - INITIAL 15 MINUTES 5/> YEARS: Performed by: INTERNAL MEDICINE

## 2025-03-25 PROCEDURE — 3600000002 HC OR TIME - INITIAL BASE CHARGE - PROCEDURE LEVEL TWO: Performed by: INTERNAL MEDICINE

## 2025-03-25 PROCEDURE — 99152 MOD SED SAME PHYS/QHP 5/>YRS: CPT | Performed by: INTERNAL MEDICINE

## 2025-03-25 PROCEDURE — 2500000004 HC RX 250 GENERAL PHARMACY W/ HCPCS (ALT 636 FOR OP/ED): Mod: JZ | Performed by: INTERNAL MEDICINE

## 2025-03-25 PROCEDURE — 45380 COLONOSCOPY AND BIOPSY: CPT | Performed by: INTERNAL MEDICINE

## 2025-03-25 PROCEDURE — 7100000010 HC PHASE TWO TIME - EACH INCREMENTAL 1 MINUTE: Performed by: INTERNAL MEDICINE

## 2025-03-25 PROCEDURE — 3600000007 HC OR TIME - EACH INCREMENTAL 1 MINUTE - PROCEDURE LEVEL TWO: Performed by: INTERNAL MEDICINE

## 2025-03-25 PROCEDURE — 3700000013 HC SEDATION LEVEL 5+ TIME - EACH ADDITIONAL 15 MINUTES: Performed by: INTERNAL MEDICINE

## 2025-03-25 PROCEDURE — 99153 MOD SED SAME PHYS/QHP EA: CPT | Performed by: INTERNAL MEDICINE

## 2025-03-25 RX ORDER — MIDAZOLAM HYDROCHLORIDE 5 MG/ML
INJECTION, SOLUTION INTRAMUSCULAR; INTRAVENOUS AS NEEDED
Status: COMPLETED | OUTPATIENT
Start: 2025-03-25 | End: 2025-03-25

## 2025-03-25 RX ORDER — MEPERIDINE HYDROCHLORIDE 25 MG/ML
INJECTION INTRAMUSCULAR; INTRAVENOUS; SUBCUTANEOUS AS NEEDED
Status: COMPLETED | OUTPATIENT
Start: 2025-03-25 | End: 2025-03-25

## 2025-03-25 RX ORDER — SODIUM CHLORIDE, SODIUM LACTATE, POTASSIUM CHLORIDE, CALCIUM CHLORIDE 600; 310; 30; 20 MG/100ML; MG/100ML; MG/100ML; MG/100ML
75 INJECTION, SOLUTION INTRAVENOUS CONTINUOUS
Status: DISCONTINUED | OUTPATIENT
Start: 2025-03-25 | End: 2025-03-26 | Stop reason: HOSPADM

## 2025-03-25 RX ADMIN — SODIUM CHLORIDE, SODIUM LACTATE, POTASSIUM CHLORIDE, AND CALCIUM CHLORIDE 75 ML/HR: .6; .31; .03; .02 INJECTION, SOLUTION INTRAVENOUS at 11:10

## 2025-03-25 RX ADMIN — MIDAZOLAM HYDROCHLORIDE 1 MG: 5 INJECTION, SOLUTION INTRAMUSCULAR; INTRAVENOUS at 10:34

## 2025-03-25 RX ADMIN — MIDAZOLAM HYDROCHLORIDE 2.5 MG: 5 INJECTION, SOLUTION INTRAMUSCULAR; INTRAVENOUS at 10:27

## 2025-03-25 RX ADMIN — MEPERIDINE HYDROCHLORIDE 25 MG: 25 INJECTION INTRAMUSCULAR; INTRAVENOUS; SUBCUTANEOUS at 10:27

## 2025-03-25 RX ADMIN — GLUCAGON 1 MG: KIT at 10:28

## 2025-03-25 RX ADMIN — MEPERIDINE HYDROCHLORIDE 25 MG: 25 INJECTION INTRAMUSCULAR; INTRAVENOUS; SUBCUTANEOUS at 10:32

## 2025-03-25 RX ADMIN — MIDAZOLAM HYDROCHLORIDE 1.5 MG: 5 INJECTION, SOLUTION INTRAMUSCULAR; INTRAVENOUS at 10:36

## 2025-03-25 RX ADMIN — MIDAZOLAM HYDROCHLORIDE 1 MG: 5 INJECTION, SOLUTION INTRAMUSCULAR; INTRAVENOUS at 10:38

## 2025-03-25 RX ADMIN — SODIUM CHLORIDE, SODIUM LACTATE, POTASSIUM CHLORIDE, AND CALCIUM CHLORIDE 75 ML/HR: .6; .31; .03; .02 INJECTION, SOLUTION INTRAVENOUS at 09:42

## 2025-03-25 ASSESSMENT — PAIN - FUNCTIONAL ASSESSMENT
PAIN_FUNCTIONAL_ASSESSMENT: 0-10

## 2025-03-25 ASSESSMENT — PAIN SCALES - GENERAL
PAINLEVEL_OUTOF10: 0 - NO PAIN

## 2025-03-25 ASSESSMENT — COLUMBIA-SUICIDE SEVERITY RATING SCALE - C-SSRS
1. IN THE PAST MONTH, HAVE YOU WISHED YOU WERE DEAD OR WISHED YOU COULD GO TO SLEEP AND NOT WAKE UP?: NO
6. HAVE YOU EVER DONE ANYTHING, STARTED TO DO ANYTHING, OR PREPARED TO DO ANYTHING TO END YOUR LIFE?: NO
2. HAVE YOU ACTUALLY HAD ANY THOUGHTS OF KILLING YOURSELF?: NO

## 2025-03-25 NOTE — DISCHARGE INSTRUCTIONS
Patient Instructions after a Colonoscopy      The anesthetics, sedatives or narcotics which were given to you today will be acting in your body for the next 24 hours, so you might feel a little sleepy or groggy.  This feeling should slowly wear off. Carefully read and follow the instructions.     You received sedation today:  - Do not drive or operate any machinery or power tools of any kind.   - No alcoholic beverages today, not even beer or wine.  - Do not make any important decisions or sign any legal documents.  - No over the counter medications that contain alcohol or that may cause drowsiness.  - Do not make any important decisions or sign any legal documents.  - Make sure you have someone with you for first 24 hours.    While it is common to experience mild to moderate abdominal distention, gas, or belching after your procedure, if any of these symptoms occur following discharge from the GI Lab or within one week of having your procedure, call the [facility] to be advised whether a visit to your nearest Urgent Care or Emergency Department is indicated.  Take this paper with you if you go.     - If you develop an allergic reaction to the medications that were given during your procedure such as difficulty breathing, rash, hives, severe nausea, vomiting or lightheadedness.  - If you experience chest pain, shortness of breath, severe abdominal pain, fevers and chills.  -If you develop signs and symptoms of bleeding such as blood in your spit, if your stools turn black, tarry, or bloody  - If you have not urinated within 8 hours following your procedure.  - If your IV site becomes painful, red, inflamed, or looks infected.    If you received a biopsy/polypectomy/sphincterotomy the following instructions apply below:    _X_ Do not use Aspirin containing products, non-steroidal medications or anti-coagulants for one week following your procedure. (Examples of these types of medications are: Advil,  Arthrotec, Aleve, Coumadin, Ecotrin, Heparin, Ibuprofen, Indocin, Motrin, Naprosyn, Nuprin, Plavix, Vioxx, and Voltarin, or their generic forms.  This list is not all-inclusive.  Check with your physician or pharmacist before resuming medications.)   _X_ Eat a soft diet today.  Avoid foods that are poorly digested for the next 24 hours.  These foods would include: nuts, beans, lettuce, red meats, and fried foods. Start with liquids and advance your diet as tolerated, gradually work up to eating solids.   _X_ Do not have a Barium Study or Enema for one week.    Your physician recommends the additional following instructions:    -You have a contact number available for emergencies. The signs and symptoms of potential delayed complications were discussed with you. You may return to normal activities tomorrow.  -Resume your previous diet.  -Continue your present medications.   -We are waiting for your pathology results.  -Your physician has recommended a repeat colonoscopy (date to be determined after pending pathology results are reviewed) for surveillance based on pathology results.  -The findings and recommendations have been discussed with you.  -The findings and recommendations were discussed with your family.  - Please see Medication Reconciliation Form for new medication/medications prescribed.       If you experience any problems or have any questions following discharge from the GI Lab, please call Dr. Dumont's office

## 2025-03-25 NOTE — H&P
History Of Present Illness  Latanya Rushing is a 64 y.o. female presenting with colon cancer screening.     Past Medical History  Past Medical History:   Diagnosis Date    Adverse effect of anesthesia     BP drops with gas    COVID-19 12/19/2024    Hyperlipidemia     Personal history of other specified conditions 06/27/2022    History of persistent cough     Surgical History  Past Surgical History:   Procedure Laterality Date    OTHER SURGICAL HISTORY  11/18/2019    Colonoscopy    OTHER SURGICAL HISTORY  11/18/2019    Tonsillectomy with adenoidectomy    OTHER SURGICAL HISTORY  11/18/2019    Urethral dilation    OTHER SURGICAL HISTORY  11/18/2019    Endometrial ablation    OTHER SURGICAL HISTORY  03/01/2021    Dilation and curettage     Social History  She reports that she has never smoked. She has never used smokeless tobacco. She reports that she does not currently use alcohol after a past usage of about 4.0 standard drinks of alcohol per week. She reports that she does not use drugs.    Family History  Family History   Family history unknown: Yes        Allergies  Allergies   Allergen Reactions    Amoxicillin Itching    Avocado Other    Oxytetracycline Other     Other reaction(s): Unknown    Phenobarbital Unknown     Other reaction(s): Hives    Shellfish Containing Products Other    Shellfish Derived Other     Other reaction(s): Gatrointestinal upset    Terramycin Other    Sulfamethoxazole-Trimethoprim Rash     Review of Systems  Pre-sedation Evaluation:  ASA Classification - ASA 2 - Patient with mild systemic disease with no functional limitations  Mallampati Score - II (hard and soft palate, upper portion of tonsils and uvula visible)    Physical Exam  Vitals and nursing note reviewed.   Constitutional:       Appearance: Normal appearance.   HENT:      Head: Normocephalic.      Mouth/Throat:      Mouth: Mucous membranes are moist.      Pharynx: Oropharynx is clear.   Eyes:      Conjunctiva/sclera: Conjunctivae  "normal.      Pupils: Pupils are equal, round, and reactive to light.   Cardiovascular:      Rate and Rhythm: Normal rate and regular rhythm.      Heart sounds: Normal heart sounds.   Pulmonary:      Effort: Pulmonary effort is normal.      Breath sounds: Normal breath sounds.   Abdominal:      General: Abdomen is flat. Bowel sounds are normal.      Palpations: Abdomen is soft.   Musculoskeletal:      Cervical back: Normal range of motion and neck supple.   Skin:     General: Skin is warm and dry.   Neurological:      General: No focal deficit present.      Mental Status: She is alert and oriented to person, place, and time.   Psychiatric:         Behavior: Behavior normal.          Last Recorded Vitals  Blood pressure (!) 143/96, pulse 81, temperature 36.7 °C (98 °F), temperature source Temporal, resp. rate 14, height 1.727 m (5' 8\"), weight 75.3 kg (166 lb 0.1 oz), SpO2 99%.     Assessment/Plan   Problem List Items Addressed This Visit    None  Visit Diagnoses       Colon cancer screening        Relevant Orders    Colonoscopy Screening; Average Risk Patient               PTA/Current Medications:  (Not in a hospital admission)    Current Outpatient Medications   Medication Sig Dispense Refill    azelastine (Optivar) 0.05 % ophthalmic solution Administer 1 drop into both eyes 2 times a day. 6 mL 11    fluticasone (Flonase Allergy Relief) 50 mcg/actuation nasal spray Administer into affected nostril(s).      rosuvastatin (Crestor) 20 mg tablet Take 1 tablet (20 mg) by mouth once daily. 90 tablet 3    aspirin 81 mg EC tablet Take 1 tablet (81 mg) by mouth once daily.      collagen/biotin/ascorbic acid (COLLAGEN 1500 PLUS C ORAL) Take by mouth.      fructooligosaccharides (PREBIOTIC FIBER, FOS, ORAL) Take by mouth.      lactobacillus combination no.4 (Probiotic) 3 billion cell capsule Take by mouth.      montelukast (Singulair) 10 mg tablet Take 1 tablet (10 mg) by mouth once daily at bedtime. (Patient not taking: " Reported on 3/25/2025) 30 tablet 3    multivitamin-Ca-iron-minerals tablet Take by mouth once daily.      SUMAtriptan (Imitrex) 50 mg tablet Take 1 tablet (50 mg) by mouth every 2 hours if needed for migraine (with max of 200 mg per day). (Patient not taking: Reported on 3/25/2025) 9 tablet 11     Current Facility-Administered Medications   Medication Dose Route Frequency Provider Last Rate Last Admin    lactated Ringer's infusion  75 mL/hr intravenous Continuous Hussain Dumont, DO Hussain Dumont, DO

## 2025-04-09 LAB
LABORATORY COMMENT REPORT: NORMAL
PATH REPORT.FINAL DX SPEC: NORMAL
PATH REPORT.GROSS SPEC: NORMAL
PATH REPORT.RELEVANT HX SPEC: NORMAL
PATH REPORT.TOTAL CANCER: NORMAL

## 2025-04-14 ENCOUNTER — TELEPHONE (OUTPATIENT)
Dept: GASTROENTEROLOGY | Facility: CLINIC | Age: 65
End: 2025-04-14
Payer: COMMERCIAL

## 2025-04-14 NOTE — TELEPHONE ENCOUNTER
Contacted pt to advise. Pt verbalized understanding. No further action needed at this time.   ----- Message from Hussain Dumont sent at 4/10/2025 11:22 AM EDT -----  Colonoscopy did reveal a small adenomatous polyp.  Repeat colonoscopy in 5 years

## 2025-06-10 ENCOUNTER — APPOINTMENT (OUTPATIENT)
Dept: PRIMARY CARE | Facility: CLINIC | Age: 65
End: 2025-06-10
Payer: COMMERCIAL

## 2025-06-10 VITALS
OXYGEN SATURATION: 98 % | SYSTOLIC BLOOD PRESSURE: 122 MMHG | HEIGHT: 68 IN | HEART RATE: 78 BPM | WEIGHT: 167 LBS | DIASTOLIC BLOOD PRESSURE: 70 MMHG | BODY MASS INDEX: 25.31 KG/M2

## 2025-06-10 DIAGNOSIS — E78.00 HIGH CHOLESTEROL: ICD-10-CM

## 2025-06-10 DIAGNOSIS — H10.13 ALLERGIC CONJUNCTIVITIS OF BOTH EYES: ICD-10-CM

## 2025-06-10 PROCEDURE — 1036F TOBACCO NON-USER: CPT | Performed by: FAMILY MEDICINE

## 2025-06-10 PROCEDURE — 99214 OFFICE O/P EST MOD 30 MIN: CPT | Performed by: FAMILY MEDICINE

## 2025-06-10 PROCEDURE — 3008F BODY MASS INDEX DOCD: CPT | Performed by: FAMILY MEDICINE

## 2025-06-10 RX ORDER — AZELASTINE HYDROCHLORIDE 0.5 MG/ML
1 SOLUTION/ DROPS OPHTHALMIC 2 TIMES DAILY
Qty: 6 ML | Refills: 11 | Status: SHIPPED | OUTPATIENT
Start: 2025-06-10 | End: 2026-06-10

## 2025-06-10 RX ORDER — MONTELUKAST SODIUM 10 MG/1
10 TABLET ORAL NIGHTLY
Qty: 30 TABLET | Refills: 3 | Status: SHIPPED | OUTPATIENT
Start: 2025-06-10

## 2025-06-10 RX ORDER — ROSUVASTATIN CALCIUM 20 MG/1
20 TABLET, COATED ORAL DAILY
Qty: 90 TABLET | Refills: 3 | Status: SHIPPED | OUTPATIENT
Start: 2025-06-10 | End: 2026-06-10

## 2025-06-10 ASSESSMENT — PATIENT HEALTH QUESTIONNAIRE - PHQ9
SUM OF ALL RESPONSES TO PHQ9 QUESTIONS 1 AND 2: 0
2. FEELING DOWN, DEPRESSED OR HOPELESS: NOT AT ALL
1. LITTLE INTEREST OR PLEASURE IN DOING THINGS: NOT AT ALL

## 2025-06-10 NOTE — PROGRESS NOTES
"Subjective   Patient ID: Latanya Rushing is a 64 y.o. female who presents for pt here for yearly med check .  HPI  Patient Health Questionnaire-2 Score: 0 (6/10/2025  1:06 PM)       High cholesterol has been on crestor for 5 years with no SE     Cardiology consult August 2024  - Keep ASA, high intensity statin.  - Follow up with echo, stress test, 48h holter monitor not done      All Rhin otc meds    On eye drops rx  spring is worse      Migraine HA with no aura some photophobia     Usually takes 2 sumatriptan  most of time     Last sumatriptan 7 months          Mammogram UTD   Bone density osteopenia and on calcium vitamin d  Aerobic exercise plays [employer] 3 to 4 times  per week     Colonoscopy March 2025  needed every 5 years for family history     Father had Alzheimers    States some memory issues    Discussed ways to maintain mental activity         Hypersomnolence      July 2025 was normal sleep study  Gets normal amount of sleep     Naps during the day     Feels better with 1 hour nap     Concentration during the day is normal      After lunch poor concendtration      Occ fall asleep during the day      Caffeine in the am      Sister passed away this year and taking care of mother        Some trouble falling asleep with stress          Venlaxafine for hot flashes did not help   Has stop HRT  sees women care       2 weeks ago had CP      Food and stress related      Pyrosis and indigestion      Left sided camping and playing boardgame     After dinner    Last 15 to 20 minutes     Evening   Not heavy pressure   6/10   No nausea no cold sweat   No radiation     No exertional chest pain   Pickle ball and treadmill       Review of Systems    Objective   Visit Vitals  /70   Pulse 78   Ht 1.727 m (5' 8\")   Wt 75.8 kg (167 lb)   SpO2 98%   BMI 25.39 kg/m²   OB Status Postmenopausal   Smoking Status Never   BSA 1.91 m²       Physical Exam  Vitals reviewed.   Constitutional:       General: She is not in acute " distress.     Appearance: Normal appearance.   HENT:      Head: Normocephalic and atraumatic.   Eyes:      Conjunctiva/sclera: Conjunctivae normal.   Cardiovascular:      Rate and Rhythm: Normal rate and regular rhythm.      Heart sounds: No murmur heard.  Pulmonary:      Effort: Pulmonary effort is normal.      Breath sounds: Normal breath sounds.   Abdominal:      General: Abdomen is flat. Bowel sounds are normal.      Palpations: Abdomen is soft. There is no mass.   Musculoskeletal:      Cervical back: Neck supple.   Lymphadenopathy:      Cervical: No cervical adenopathy.   Skin:     General: Skin is warm and dry.   Neurological:      General: No focal deficit present.      Mental Status: She is alert and oriented to person, place, and time.   Psychiatric:         Mood and Affect: Mood normal.         Behavior: Behavior normal.         Thought Content: Thought content normal.         Judgment: Judgment normal.              Assessment/Plan   Diagnoses and all orders for this visit:  High cholesterol  -     rosuvastatin (Crestor) 20 mg tablet; Take 1 tablet (20 mg) by mouth once daily.  Allergic conjunctivitis of both eyes  -     montelukast (Singulair) 10 mg tablet; Take 1 tablet (10 mg) by mouth once daily at bedtime.  -     azelastine (Optivar) 0.05 % ophthalmic solution; Administer 1 drop into both eyes 2 times a day.           Aroldo Ku MD 06/10/25 1:35 PM

## 2025-08-15 DIAGNOSIS — G43.009 MIGRAINE WITHOUT AURA AND WITHOUT STATUS MIGRAINOSUS, NOT INTRACTABLE: ICD-10-CM

## 2025-08-15 RX ORDER — SUMATRIPTAN SUCCINATE 50 MG/1
50 TABLET ORAL EVERY 2 HOUR PRN
Qty: 9 TABLET | Refills: 11 | Status: SHIPPED | OUTPATIENT
Start: 2025-08-15

## 2026-06-09 ENCOUNTER — APPOINTMENT (OUTPATIENT)
Dept: PRIMARY CARE | Facility: CLINIC | Age: 66
End: 2026-06-09
Payer: COMMERCIAL